# Patient Record
Sex: MALE | Race: WHITE | NOT HISPANIC OR LATINO | Employment: FULL TIME | URBAN - METROPOLITAN AREA
[De-identification: names, ages, dates, MRNs, and addresses within clinical notes are randomized per-mention and may not be internally consistent; named-entity substitution may affect disease eponyms.]

---

## 2020-11-02 ENCOUNTER — OFFICE VISIT (OUTPATIENT)
Dept: FAMILY MEDICINE CLINIC | Facility: CLINIC | Age: 35
End: 2020-11-02
Payer: COMMERCIAL

## 2020-11-02 VITALS
RESPIRATION RATE: 16 BRPM | DIASTOLIC BLOOD PRESSURE: 70 MMHG | SYSTOLIC BLOOD PRESSURE: 110 MMHG | HEART RATE: 98 BPM | WEIGHT: 162.6 LBS | BODY MASS INDEX: 24.64 KG/M2 | TEMPERATURE: 97.3 F | HEIGHT: 68 IN | OXYGEN SATURATION: 97 %

## 2020-11-02 DIAGNOSIS — F90.2 ATTENTION DEFICIT HYPERACTIVITY DISORDER (ADHD), COMBINED TYPE: Primary | ICD-10-CM

## 2020-11-02 DIAGNOSIS — F41.1 GAD (GENERALIZED ANXIETY DISORDER): ICD-10-CM

## 2020-11-02 PROBLEM — F90.9 ADHD (ATTENTION DEFICIT HYPERACTIVITY DISORDER): Status: ACTIVE | Noted: 2020-11-02

## 2020-11-02 PROCEDURE — 99204 OFFICE O/P NEW MOD 45 MIN: CPT | Performed by: FAMILY MEDICINE

## 2020-11-02 PROCEDURE — 3008F BODY MASS INDEX DOCD: CPT | Performed by: FAMILY MEDICINE

## 2020-11-02 PROCEDURE — 3725F SCREEN DEPRESSION PERFORMED: CPT | Performed by: FAMILY MEDICINE

## 2020-11-02 PROCEDURE — 1036F TOBACCO NON-USER: CPT | Performed by: FAMILY MEDICINE

## 2020-11-02 RX ORDER — DEXTROAMPHETAMINE SACCHARATE, AMPHETAMINE ASPARTATE, DEXTROAMPHETAMINE SULFATE AND AMPHETAMINE SULFATE 1.25; 1.25; 1.25; 1.25 MG/1; MG/1; MG/1; MG/1
TABLET ORAL
COMMUNITY
Start: 2020-09-22 | End: 2020-11-02 | Stop reason: SDUPTHER

## 2020-11-02 RX ORDER — FLUOXETINE 20 MG/1
TABLET, FILM COATED ORAL
COMMUNITY
Start: 2020-10-13

## 2020-11-02 RX ORDER — DEXTROAMPHETAMINE SACCHARATE, AMPHETAMINE ASPARTATE, DEXTROAMPHETAMINE SULFATE AND AMPHETAMINE SULFATE 1.25; 1.25; 1.25; 1.25 MG/1; MG/1; MG/1; MG/1
5 TABLET ORAL DAILY
Qty: 30 TABLET | Refills: 0 | Status: SHIPPED | OUTPATIENT
Start: 2020-11-02 | End: 2020-11-30 | Stop reason: SDUPTHER

## 2020-11-02 RX ORDER — LORAZEPAM 1 MG/1
TABLET ORAL
COMMUNITY
Start: 2020-10-30

## 2020-11-30 DIAGNOSIS — F90.2 ATTENTION DEFICIT HYPERACTIVITY DISORDER (ADHD), COMBINED TYPE: ICD-10-CM

## 2020-11-30 RX ORDER — DEXTROAMPHETAMINE SACCHARATE, AMPHETAMINE ASPARTATE, DEXTROAMPHETAMINE SULFATE AND AMPHETAMINE SULFATE 1.25; 1.25; 1.25; 1.25 MG/1; MG/1; MG/1; MG/1
5 TABLET ORAL DAILY
Qty: 30 TABLET | Refills: 0 | Status: SHIPPED | OUTPATIENT
Start: 2020-11-30 | End: 2021-01-05 | Stop reason: SDUPTHER

## 2021-01-05 DIAGNOSIS — F90.2 ATTENTION DEFICIT HYPERACTIVITY DISORDER (ADHD), COMBINED TYPE: ICD-10-CM

## 2021-01-07 RX ORDER — DEXTROAMPHETAMINE SACCHARATE, AMPHETAMINE ASPARTATE, DEXTROAMPHETAMINE SULFATE AND AMPHETAMINE SULFATE 1.25; 1.25; 1.25; 1.25 MG/1; MG/1; MG/1; MG/1
5 TABLET ORAL DAILY
Qty: 30 TABLET | Refills: 0 | Status: SHIPPED | OUTPATIENT
Start: 2021-01-07

## 2021-03-10 DIAGNOSIS — Z23 ENCOUNTER FOR IMMUNIZATION: ICD-10-CM

## 2022-06-14 ENCOUNTER — TELEPHONE (OUTPATIENT)
Dept: UROLOGY | Facility: AMBULATORY SURGERY CENTER | Age: 37
End: 2022-06-14

## 2022-08-08 ENCOUNTER — OFFICE VISIT (OUTPATIENT)
Dept: UROLOGY | Facility: MEDICAL CENTER | Age: 37
End: 2022-08-08
Payer: COMMERCIAL

## 2022-08-08 VITALS
SYSTOLIC BLOOD PRESSURE: 120 MMHG | OXYGEN SATURATION: 97 % | HEIGHT: 68 IN | HEART RATE: 87 BPM | BODY MASS INDEX: 22.79 KG/M2 | WEIGHT: 150.4 LBS | DIASTOLIC BLOOD PRESSURE: 80 MMHG

## 2022-08-08 DIAGNOSIS — R33.9 INCOMPLETE BLADDER EMPTYING: Primary | ICD-10-CM

## 2022-08-08 DIAGNOSIS — R39.198 SLOW URINARY STREAM: ICD-10-CM

## 2022-08-08 PROBLEM — R35.0 URINE FREQUENCY: Status: ACTIVE | Noted: 2022-08-08

## 2022-08-08 LAB
POST-VOID RESIDUAL VOLUME, ML POC: 41 ML
SL AMB  POCT GLUCOSE, UA: NORMAL
SL AMB LEUKOCYTE ESTERASE,UA: NORMAL
SL AMB POCT BILIRUBIN,UA: NORMAL
SL AMB POCT BLOOD,UA: NORMAL
SL AMB POCT CLARITY,UA: CLEAR
SL AMB POCT COLOR,UA: YELLOW
SL AMB POCT KETONES,UA: NORMAL
SL AMB POCT NITRITE,UA: NORMAL
SL AMB POCT PH,UA: 7
SL AMB POCT SPECIFIC GRAVITY,UA: 1.01
SL AMB POCT URINE PROTEIN: NORMAL
SL AMB POCT UROBILINOGEN: 0.2

## 2022-08-08 PROCEDURE — 99204 OFFICE O/P NEW MOD 45 MIN: CPT | Performed by: UROLOGY

## 2022-08-08 PROCEDURE — 51798 US URINE CAPACITY MEASURE: CPT | Performed by: UROLOGY

## 2022-08-08 PROCEDURE — 81003 URINALYSIS AUTO W/O SCOPE: CPT | Performed by: UROLOGY

## 2022-08-08 RX ORDER — FLUTICASONE PROPIONATE 50 MCG
1 SPRAY, SUSPENSION (ML) NASAL 2 TIMES DAILY
COMMUNITY

## 2022-08-08 RX ORDER — FLUOXETINE HYDROCHLORIDE 40 MG/1
40 CAPSULE ORAL DAILY
COMMUNITY
Start: 2022-06-15

## 2022-08-08 RX ORDER — MAGNESIUM 200 MG
TABLET ORAL
COMMUNITY

## 2022-08-08 NOTE — PROGRESS NOTES
HISTORY:    Two years or so of difficulty starting urination with slowing of stream   Sometimes has to bear down as though he were having a difficult bowel movement  Sometimes, the problem is not so bad  He does notice it is less trouble getting started when he sits to void  Rare nocturia, stream is good when his bladder is full  No hematuria infections stones    Of note, he has been on ketamine injections for severe anxiety, he stop those six weeks ago because of possible bladder side effects  And he thinks perhaps the problem is not so bad since he stopped that med  ASSESSMENT / PLAN:    Normal examination, minimal PVR only 41 mL  Unlikely there is any anatomic obstruction  Possibly, the ketamine was a related side effect although that medicine is more likely to cause bladder inflammation and not just difficulty starting  He will monitor symptoms and let me know if any further issues    The following portions of the patient's history were reviewed and updated as appropriate: allergies, current medications, past family history, past medical history, past social history, past surgical history and problem list     Review of Systems   All other systems reviewed and are negative  Objective:     Physical Exam  Constitutional:       General: He is not in acute distress  Appearance: He is well-developed  He is not diaphoretic  HENT:      Head: Normocephalic and atraumatic  Eyes:      General: No scleral icterus  Pulmonary:      Effort: Pulmonary effort is normal    Genitourinary:     Comments: Penis testes normal    Prostate small, normal for age  Skin:     Coloration: Skin is not pale  Neurological:      Mental Status: He is alert and oriented to person, place, and time  Psychiatric:         Behavior: Behavior normal          Thought Content:  Thought content normal          Judgment: Judgment normal            No results found for: PSA]  No results found for: BUN  No results found for: CREATININE  No components found for: CBC      Patient Active Problem List   Diagnosis    ADHD (attention deficit hyperactivity disorder)    ANAI (generalized anxiety disorder)    Urine frequency    Incomplete bladder emptying    Slow urinary stream        Diagnoses and all orders for this visit:    Incomplete bladder emptying  -     POCT urine dip auto non-scope    Slow urinary stream    Other orders  -     Magnesium 200 MG TABS; Take by mouth  -     Cholecalciferol 125 MCG (5000 UT) capsule; Take 5,000 Units by mouth daily  -     FLUoxetine (PROzac) 40 MG capsule; Take 40 mg by mouth daily  -     fluticasone (FLONASE) 50 mcg/act nasal spray; 1 spray into each nostril 2 (two) times a day           Patient ID: Trisha Barnes is a 40 y o  male  Current Outpatient Medications:     Cholecalciferol 125 MCG (5000 UT) capsule, Take 5,000 Units by mouth daily, Disp: , Rfl:     FLUoxetine (PROzac) 40 MG capsule, Take 40 mg by mouth daily, Disp: , Rfl:     fluticasone (FLONASE) 50 mcg/act nasal spray, 1 spray into each nostril 2 (two) times a day, Disp: , Rfl:     LORazepam (ATIVAN) 1 mg tablet, , Disp: , Rfl:     Magnesium 200 MG TABS, Take by mouth, Disp: , Rfl:     amphetamine-dextroamphetamine (ADDERALL) 5 MG tablet, Take 1 tablet (5 mg total) by mouth dailyMax Daily Amount: 5 mg (Patient not taking: Reported on 8/8/2022), Disp: 30 tablet, Rfl: 0    FLUoxetine (PROzac) 20 MG tablet, , Disp: , Rfl:     Past Medical History:   Diagnosis Date    ADHD     Anxiety     Gastroesophageal reflux disease without esophagitis 2010       History reviewed  No pertinent surgical history      Social History

## 2024-04-08 ENCOUNTER — TELEPHONE (OUTPATIENT)
Age: 39
End: 2024-04-08

## 2024-04-08 NOTE — TELEPHONE ENCOUNTER
Patient called stating he wanted to schedule a vasectomy .  He wanted Dr. Meeks.  No appointments available at this time.  Patient will call back to set up appointment.

## 2024-04-11 ENCOUNTER — TELEPHONE (OUTPATIENT)
Dept: UROLOGY | Facility: AMBULATORY SURGERY CENTER | Age: 39
End: 2024-04-11

## 2024-04-11 NOTE — TELEPHONE ENCOUNTER
Received message about patient needing VAS consult and VAS done. Called and scheduled both consult and VAS.

## 2024-05-07 ENCOUNTER — OFFICE VISIT (OUTPATIENT)
Dept: UROLOGY | Facility: AMBULATORY SURGERY CENTER | Age: 39
End: 2024-05-07
Payer: COMMERCIAL

## 2024-05-07 VITALS
DIASTOLIC BLOOD PRESSURE: 68 MMHG | SYSTOLIC BLOOD PRESSURE: 124 MMHG | HEART RATE: 81 BPM | OXYGEN SATURATION: 98 % | WEIGHT: 164 LBS | HEIGHT: 68 IN | BODY MASS INDEX: 24.86 KG/M2

## 2024-05-07 DIAGNOSIS — Z30.2 ENCOUNTER FOR STERILIZATION: Primary | ICD-10-CM

## 2024-05-07 PROCEDURE — 99203 OFFICE O/P NEW LOW 30 MIN: CPT

## 2024-05-07 RX ORDER — DULOXETIN HYDROCHLORIDE 30 MG/1
CAPSULE, DELAYED RELEASE ORAL
COMMUNITY
Start: 2024-04-01

## 2024-05-07 RX ORDER — DEXTROAMPHETAMINE SACCHARATE, AMPHETAMINE ASPARTATE, DEXTROAMPHETAMINE SULFATE AND AMPHETAMINE SULFATE 2.5; 2.5; 2.5; 2.5 MG/1; MG/1; MG/1; MG/1
TABLET ORAL
COMMUNITY
Start: 2024-03-04

## 2024-05-07 RX ORDER — DARIDOREXANT 50 MG/1
1 TABLET, FILM COATED ORAL
COMMUNITY
Start: 2024-04-16

## 2024-05-07 RX ORDER — TRIAMCINOLONE ACETONIDE 1 MG/G
CREAM TOPICAL
COMMUNITY
Start: 2024-02-12

## 2024-05-07 RX ORDER — LORAZEPAM 2 MG/1
2 TABLET ORAL
Qty: 1 TABLET | Refills: 0 | Status: SHIPPED | OUTPATIENT
Start: 2024-05-07

## 2024-05-07 NOTE — PROGRESS NOTES
5/7/2024      No chief complaint on file.        Assessment and Plan    39 y.o. male managed by new patient    1. Desire for elective sterilization  - exam today as below  - informed consent signed today  - continue contraception  - rx Ativan with  to/from on appt date  - shave scrotal/pubic hair day prior to appt date    Return for vasectomy as scheduled.      History of Present Illness  Pawel Dumont is a 39 y.o. male here for evaluation of VASECTOMY CONSULT    History of genitourinary or groin trauma or surgery-No  Fathered children-3   Personal and/or mutual desire for permanent sterility-yes  Current contraceptive method-condoms  Work/manual labor/lifting-no   Voiding issues- none.  Patient's previous urinary concerns while on ketamine has dissipated since discontinuation of ketamine  Bleeding issues/thinners- none  Allergies to lidocaine/marcaine/betadine/chromic- none    The patient presents requesting elective sterilization vasectomy.     We discussed that vasectomy is in operation performed in the office in order to provide elective sterilization. This procedure should be considered a permanent option. Although there are subspecialists who perform vasectomy reversals, these operations are not 100% successful and are often not covered by insurance meaning they can come with a large out-of-pocket cost. The patient understands this.     We reviewed the procedure in depth. Risks and benefits of the procedure were discussed and reviewed. Risks described included hematoma formation, Infection, sperm granuloma, epididymitis, post-vasectomy pain syndrome, and vasectomy failure  Informed consent was obtained in the office today. The patient was prescribed a benzodiazepine to take one hour prior to the procedure to assist with his comfort.  He understands that he will require transportation to and from the office that day if he is to use the benzodiazepine.       He also understands he will require semen  analysis testing at 3 months post procedure to ensure full sterilization.  In the interim, he will require contraception during intercourse to avoid an undesired pregnancy.     Usually, patients are out of work for 2-3 days. We recommend tight fitting scrotal support following the procedure along with ice packs applied to the scrotum 15 minutes on and 15 minutes off for the first 24 hours. We discussed that we do send the patient home with short course of anti-inflammatory and/or narcotic pain medication.     After this discussion, the patient agrees to proceed. We will schedule him in the near future.    He agrees to take oral sedative -Ativan 2 mg one hour prior to procedure.        Review of Systems   Constitutional: Negative.  Negative for chills, fatigue and fever.   HENT: Negative.     Respiratory:  Negative for shortness of breath.    Cardiovascular:  Negative for chest pain.   Gastrointestinal: Negative.  Negative for abdominal pain.   Endocrine: Negative.    Musculoskeletal: Negative.    Skin: Negative.    Neurological: Negative.  Negative for dizziness and light-headedness.   Hematological: Negative.    Psychiatric/Behavioral: Negative.             AUA SYMPTOM SCORE      Flowsheet Row Most Recent Value   AUA SYMPTOM SCORE    How often have you had a sensation of not emptying your bladder completely after you finished urinating? 1 (P)    How often have you had to urinate again less than two hours after you finished urinating? 1 (P)    How often have you found you stopped and started again several times when you urinate? 1 (P)    How often have you found it difficult to postpone urination? 0 (P)    How often have you had a weak urinary stream? 1 (P)    How often have you had to push or strain to begin urination? 0 (P)    How many times did you most typically get up to urinate from the time you went to bed at night until the time you got up in the morning? 1 (P)    Quality of Life: If you were to spend the  rest of your life with your urinary condition just the way it is now, how would you feel about that? 1 (P)    AUA SYMPTOM SCORE 5 (P)              Vitals  There were no vitals filed for this visit.    Physical Exam    General: Well appearing, no distress, appears stated age.  HEENT:  Normocephalic, atraumatic. Conjunctiva clear.  Respiratory: Nonlabored respirations, no wheeze or cough  Abdomen:  Soft nontender without hernia. No suprapubic or CVA tenderness.  Genitourinary: Circumcised penis, normal phallus, orthotopic patent meatus.  Testes smooth descended bilaterally into the scrotum nontender with no palpable mass.  Palpably normal spermatic cord and vas deferens bilaterally.  Musculoskeletal:  Normal range of motion and gait without defecit.  Neuro: No gross neurologic defect or abnormality. Steady unassisted gait. Speech and affect normal.  Dermatologic: skin warm, dry; no rash erythema or ecchymosis      Past History  Past Medical History:   Diagnosis Date    ADHD     Anxiety     Gastroesophageal reflux disease without esophagitis 2010     Social History     Socioeconomic History    Marital status: Single     Spouse name: Not on file    Number of children: Not on file    Years of education: Not on file    Highest education level: Not on file   Occupational History    Not on file   Tobacco Use    Smoking status: Never    Smokeless tobacco: Never   Vaping Use    Vaping status: Former    Substances: THC   Substance and Sexual Activity    Alcohol use: Never    Drug use: Yes     Types: Marijuana    Sexual activity: Not Currently   Other Topics Concern    Not on file   Social History Narrative    Not on file     Social Determinants of Health     Financial Resource Strain: Low Risk  (8/4/2022)    [facility]    Overall Financial Resource Strain (CARDIA)     Difficulty of Paying Living Expenses: Not hard at all   Food Insecurity: No Food Insecurity (8/4/2022)    Received from [employer]  [facility]    Hunger Vital Sign     Worried About Running Out of Food in the Last Year: Never true     Ran Out of Food in the Last Year: Never true   Transportation Needs: No Transportation Needs (8/4/2022)    Received from [facility]    PRAPARE - Transportation     Lack of Transportation (Medical): No     Lack of Transportation (Non-Medical): No   Physical Activity: Not on file   Stress: Stress Concern Present (8/4/2022)    Received from [facility]    [place] [employer] - Occupational Stress Questionnaire     Feeling of Stress : To some extent   Social Connections: Moderately Isolated (8/4/2022)    Received from [facility]    Social Connection and Isolation Panel [NHANES]     Frequency of Communication with Friends and Family: More than three times a week     Frequency of Social Gatherings with Friends and Family: Once a week     Attends [employer] Services: Never     Active Member of Clubs or Organizations: No     Attends Club or Organization Meetings: Never     Marital Status:    Intimate Partner Violence: Not At Risk (8/4/2022)    Received from [facility]    Humiliation, Afraid, Rape, and Kick questionnaire     Fear of Current or Ex-Partner: No     Emotionally Abused: No     Physically Abused: No     Sexually Abused: No   Housing Stability: Low Risk  (8/4/2022)    Received from [facility]    Housing Stability Vital Sign     Unable to Pay for Housing in the Last Year: No     Number of Places Lived in the Last Year: 1     Unstable Housing in the Last Year: No     Social History     Tobacco Use   Smoking Status Never   Smokeless Tobacco Never     Family History   Problem Relation Age of Onset    Prostate cancer Father     Kidney nephrosis Mother     Colon cancer Paternal Grandfather        The following portions of the patient's history were reviewed and updated as appropriate: allergies,  "current medications, past medical history, past social history, past surgical history and problem list.    Results  No results found for this or any previous visit (from the past 1 hour(s)).]  No results found for: \"PSA\"  Lab Results   Component Value Date    CALCIUM 9.6 03/07/2024    K 4.8 03/07/2024    CO2 29 03/07/2024     03/07/2024    BUN 16 03/07/2024    CREATININE 0.70 03/07/2024     No results found for: \"WBC\", \"HGB\", \"HCT\", \"MCV\", \"PLT\"      "

## 2024-05-29 ENCOUNTER — PROCEDURE VISIT (OUTPATIENT)
Dept: UROLOGY | Facility: AMBULATORY SURGERY CENTER | Age: 39
End: 2024-05-29
Payer: COMMERCIAL

## 2024-05-29 VITALS
OXYGEN SATURATION: 99 % | DIASTOLIC BLOOD PRESSURE: 68 MMHG | BODY MASS INDEX: 24.48 KG/M2 | HEART RATE: 95 BPM | SYSTOLIC BLOOD PRESSURE: 96 MMHG | WEIGHT: 161 LBS

## 2024-05-29 DIAGNOSIS — Z30.2 ENCOUNTER FOR STERILIZATION: Primary | ICD-10-CM

## 2024-05-29 PROCEDURE — 55250 REMOVAL OF SPERM DUCT(S): CPT | Performed by: UROLOGY

## 2024-05-29 PROCEDURE — 88302 TISSUE EXAM BY PATHOLOGIST: CPT | Performed by: PATHOLOGY

## 2024-05-29 NOTE — PROGRESS NOTES
Vasectomy     Date/Time  5/29/2024 10:30 AM     Performed by  Justin Gan MD   Authorized by  Justin Gan MD           Pawel is a 39-year-old male who request elective sterilization with vasectomy.  He is  with 3 children.  He and his wife desire no further children.      Vasectomy Procedure Note:  Risks and benefits of vasectomy were previously discussed. Informed consent was obtained at his prior office visit. The patient had taken Ativan as prescribed.  The patient was placed in the supine position. His genitalia was prepped and draped in a sterile fashion. The left vas deferens was grasped and presented to the midline of the scrotum. 2% lidocaine was used to anesthetize the skin, subcutaneous tissue, and left vas deferens. A scalpeless opening was made in the midline of the scrotum. The left vas deferens was grasped and presented into the surgical field. A #15 blade was used to make a longitudinal incision in the sheath of the vas deferens. The vas itself was then dissected free from the surrounding tissue. Clamps were placed proximally and distally and a 1 cm segment of the vas deferens was excised. Silk ties were applied and exposed ends were fulgurated. Hemostasis was excellent. The vas was returned to its natural anatomic position and the same procedure was then repeated on the patient's right side. A single stitch was placed through the skin and dartos to reapproximate the defect. Bacitracin ointment was applied.      The patient is status post vasectomy. I recommended rest, ice, and scrotal support. I've asked that he return in the next 2-3 weeks for a post vasectomy check. Tylenol/Advil as needed for pain. The patient understands that he is not yet considered sterile. I recommend a single post vasectomy semen analysis at 8 weeks. In the interim I have recommended contraception to avoid an undesired pregnancy.

## 2024-05-31 PROCEDURE — 88302 TISSUE EXAM BY PATHOLOGIST: CPT | Performed by: PATHOLOGY

## 2024-07-22 ENCOUNTER — APPOINTMENT (OUTPATIENT)
Dept: LAB | Facility: CLINIC | Age: 39
End: 2024-07-22
Payer: COMMERCIAL

## 2024-07-22 DIAGNOSIS — Z30.2 ENCOUNTER FOR STERILIZATION: ICD-10-CM

## 2024-07-22 LAB
DEPRECATED CD4 CELLS/CD8 CELLS BLD: 3.6 ML
SPERM MOTILE SMN QL MICRO: NORMAL

## 2024-07-22 PROCEDURE — 89321 SEMEN ANAL SPERM DETECTION: CPT

## 2024-07-23 ENCOUNTER — TELEPHONE (OUTPATIENT)
Dept: UROLOGY | Facility: AMBULATORY SURGERY CENTER | Age: 39
End: 2024-07-23

## 2024-07-23 NOTE — TELEPHONE ENCOUNTER
----- Message from Justin Gan MD sent at 7/23/2024  7:36 AM EDT -----  Post vas SA is neg.  Ok to tell patient he does not need contraception.    FT  ----- Message -----  From: Lab, Background User  Sent: 7/22/2024   1:20 PM EDT  To: Justin Gan MD      Spoke with pt and advised of results. Pt in verbal understanding with no questions.

## 2025-07-03 ENCOUNTER — OFFICE VISIT (OUTPATIENT)
Dept: GASTROENTEROLOGY | Facility: MEDICAL CENTER | Age: 40
End: 2025-07-03
Payer: COMMERCIAL

## 2025-07-03 VITALS
HEIGHT: 68 IN | HEART RATE: 70 BPM | TEMPERATURE: 96.9 F | WEIGHT: 141 LBS | SYSTOLIC BLOOD PRESSURE: 114 MMHG | OXYGEN SATURATION: 99 % | DIASTOLIC BLOOD PRESSURE: 76 MMHG | BODY MASS INDEX: 21.37 KG/M2

## 2025-07-03 DIAGNOSIS — K64.5 EXTERNAL THROMBOSED HEMORRHOIDS: Primary | ICD-10-CM

## 2025-07-03 PROCEDURE — 99204 OFFICE O/P NEW MOD 45 MIN: CPT | Performed by: PHYSICIAN ASSISTANT

## 2025-07-03 NOTE — PROGRESS NOTES
"Name: Pawel Dumont      : 1985      MRN: [id number]  Encounter Provider: Michelle De Jesus PA-C  Encounter Date: 7/3/2025   Encounter department: [facility]  :  Assessment & Plan  External thrombosed hemorrhoids  Physical examination reveals a thrombosed external hemorrhoid, normal internal exam.  I recommend stool softeners, sitz bath's, analgesics, and local anesthetic ointment.  Symptoms should self resolve within 1 to 3 weeks.  If no improvement can consider colorectal evaluation.  For long-term management recommend high-fiber diet with 38 g daily.           History of Present Illness   Pawel Dumont is a 40 y.o. male who presents for hemorrhoids.  The patient states that he has pain and irritation from a hemorrhoid.  He denies any bleeding.  Feels a bump when he is wiping.  Typically has a formed bowel movement every other day, no straining.  He has a history of hemorrhoids which required banding.  No other GI symptoms or complaints.        Review of Systems A complete review of systems is negative other than that noted above in the HPI.       Objective   /76 (BP Location: Right arm, Patient Position: Sitting, Cuff Size: Standard)   Pulse 70   Temp (!) 96.9 °F (36.1 °C) (Tympanic)   Ht 5' 8\" (1.727 m)   Wt 64 kg (141 lb)   SpO2 99%   BMI 21.44 kg/m²       Physical Exam  Vitals and nursing note reviewed. Exam conducted with a chaperone present.   Constitutional:       General: He is not in acute distress.     Appearance: He is well-developed.   HENT:      Head: Normocephalic and atraumatic.     Eyes:      Conjunctiva/sclera: Conjunctivae normal.       Cardiovascular:      Rate and Rhythm: Normal rate and regular rhythm.      Heart sounds: No murmur heard.  Pulmonary:      Effort: Pulmonary effort is normal. No respiratory distress.      Breath sounds: Normal breath sounds.   Abdominal:      Palpations: Abdomen is soft.      Tenderness: There is no " abdominal tenderness.   Genitourinary:     Rectum: External hemorrhoid (thrombosed) present. No internal hemorrhoid. Normal anal tone.     Musculoskeletal:         General: No swelling.      Cervical back: Neck supple.     Skin:     General: Skin is warm and dry.      Capillary Refill: Capillary refill takes less than 2 seconds.     Neurological:      Mental Status: He is alert.     Psychiatric:         Mood and Affect: Mood normal.          Lab Results: I personally reviewed relevant lab results.

## 2025-07-03 NOTE — PATIENT INSTRUCTIONS
"Patient Education     High-fiber diet   The Basics   Written by the doctors and editors at [facility]   What is fiber? -- Fiber is a substance found in some fruits, vegetables, and grains. Most fiber passes through your body without being digested. But it can affect how you digest other foods, and it can also improve your bowel movements.  There are 2 kinds of fiber. One kind is called \"soluble fiber\" and is found in fruits, oats, barley, beans, and peas. The other kind is called \"insoluble fiber,\" and is found in wheat, rye, and other grains.  Both kinds of fiber that you eat are called \"dietary fiber.\"  Why is fiber important to my health? -- Fiber can help make your bowel movements softer and more regular. Adding fiber to your diet can help with problems including constipation, hemorrhoids, and diarrhea. Plus, it can help prevent \"accidents\" if you have trouble controlling your bowel movements.  Getting enough fiber can also help lower your risk of heart disease, stroke, and type 2 diabetes. That's because fiber can help lower cholesterol and help control blood sugar.  How much fiber do I need? -- The recommended amount of fiber is 38 grams a day. The nutrition label on packaged foods can show you how much fiber you are getting in each serving (figure 1).  How can I make sure I'm getting enough fiber? -- To make sure that you're getting enough fiber, eat plenty of the fruits, vegetables, and grains that contain fiber (table 1 and figure 2). Many breakfast cereals also have a lot of fiber.  If you can't get enough fiber from food, you can add wheat bran to the foods you do eat. Or you can take fiber supplements. These come in the form of powders, wafers, or pills. They include psyllium seed (sample brand names: Metamucil, Konsyl), methylcellulose (sample brand name: Citrucel), polycarbophil (sample brand name: FiberCon), and wheat dextrin (sample brand name: Benefiber). If you take a fiber supplement, be sure to " "read the label so you know how much to take. If you're not sure, ask your doctor or nurse.  What are the side effects of fiber? -- When you start eating more fiber, your belly might feel bloated, or you might have gas or cramps. You can avoid these side effects by adding fiber to your diet slowly.  Some people feel worse when they eat more fiber or take fiber supplements. If you feel worse after adding more fiber to your diet, you can try decreasing the amount of fiber to see if that helps.  All topics are updated as new evidence becomes available and our peer review process is complete.  This topic retrieved from [employer] on: Feb 28, 2024.  Topic 11198 Version 10.0  Release: 32.2.4 - C32.58  © 2024 UpToDate, Inc. and/or its affiliates. All rights reserved.  figure 1: Nutrition label - Fiber     This is an example of a nutrition label. To figure out how much fiber is in a food, look for the line that says \"Dietary Fiber.\" It's also important to look at the serving size. This food has 7 grams of fiber in each serving, and each serving is 1 cup.  Graphic 87692 Version 8.0  table 1: Amount of fiber in different foods  Food  Serving  Grams of fiber    Fruits    Apple (with skin) 1 medium apple 4.4   Banana 1 medium banana 3.1   Oranges 1 orange 3.1   Prunes 1 cup, pitted 12.4   Juices    Apple, unsweetened, with added ascorbic acid 1 cup 0.5   Grapefruit, white, canned, sweetened 1 cup 0.2   Grape, unsweetened, with added ascorbic acid 1 cup 0.5   Orange 1 cup 0.7   Vegetables    Cooked   Green beans 1 cup 4.0   Carrots 1/2 cup sliced 2.3   Peas 1 cup 8.8   Potato (baked, with skin) 1 medium potato 3.8   Raw   Cucumber (with peel) 1 cucumber 1.5   Lettuce 1 cup shredded 0.5   Tomato 1 medium tomato 1.5   Spinach 1 cup 0.7   Legumes   Baked beans, canned, no salt added 1 cup 13.9   Kidney beans, canned 1 cup 13.6   Lima beans, canned 1 cup 11.6   Lentils, boiled 1 cup 15.6   Breads, pastas, flours    Bran muffins 1 " medium muffin 5.2   Oatmeal, cooked 1 cup 4.0   White bread 1 slice 0.6   Whole-wheat bread 1 slice 1.9   Pasta and rice, cooked   Macaroni 1 cup 2.5   Rice, brown 1 cup 3.5   Rice, white 1 cup 0.6   Spaghetti (regular) 1 cup 2.5   Nuts    Almonds 1/2 cup 8.7   Peanuts 1/2 cup 7.9   To learn how much fiber and other nutrients are in different foods, visit the [place] [employer] ([employer]) Food[employer] website.  Graphic 24386 Version 6.0  figure 2: Foods with fiber     Foods with a lot of fiber include prunes, apples, oranges, bananas, peas, green beans, kidney beans, cooked oatmeal, almonds, peanuts, and whole-wheat bread.  Graphic 91696 Version 1.0  Consumer Information Use and Disclaimer   Disclaimer: This generalized information is a limited summary of diagnosis, treatment, and/or medication information. It is not meant to be comprehensive and should be used as a tool to help the user understand and/or assess potential diagnostic and treatment options. It does NOT include all information about conditions, treatments, medications, side effects, or risks that may apply to a specific patient. It is not intended to be medical advice or a substitute for the medical advice, diagnosis, or treatment of a health care provider based on the health care provider's examination and assessment of a patient's specific and unique circumstances. Patients must speak with a health care provider for complete information about their health, medical questions, and treatment options, including any risks or benefits regarding use of medications. This information does not endorse any treatments or medications as safe, effective, or approved for treating a specific patient. UpToDate, Inc. and its affiliates disclaim any warranty or liability relating to this information or the use thereof.The use of this information is governed by the Terms of Use, available at  https://www.[employer]uwer.com/en/know/clinical-effectiveness-terms. 2024© UpToDate, Inc. and its affiliates and/or licensors. All rights reserved.  Copyright   © 2024 UpToDate, Inc. and/or its affiliates. All rights reserved.      Physical examination revealed that you have a thrombosed external hemorrhoid.  This is managed conservatively with stool softeners (colace), sitz baths, analgesics (ibuprofen), and local anesthetic ointments (tucks) or witch hazel (tucks) which typically lead to symptom resolution within 1 to 3 weeks. If not improvement please let us know and we can send you to colorectal       Patient Education     How to take a sitz bath   The Basics   Written by the doctors and editors at [facility]   What is a sitz bath? -- A sitz bath is a shallow, warm bath that you sit in. It can help heal injuries in your genital area, perineum, or anal area. (The perineum is the area between the genitals and the anus.) A sitz bath can also help with symptoms like pain, swelling, itching, or burning.  Your doctor or nurse might suggest a sitz bath because you:   Had a vaginal childbirth - This is when a baby is born through the vagina. Sometimes, vaginal birth can cause the perineum to tear.   Have an anal fissure - This is a small tear in lining of the anus.   Have hemorrhoids - These are swollen veins in the lower rectum that can cause pain or itching.   Have another condition that is causing symptoms in the genital or anal area  How do I take a sitz bath?    Fill a clean bathtub with 2 to 3 inches (5 to 8 cm) of warm water - Make sure that the water is not too hot. If you do not have a bathtub, you can buy a special basin that fits over your toilet seat.   Use plain water - Do not add soap, bubble bath, or anything else to the water. If you want, you can add 1 cup of Epsom salt.   Sit in the water for 5 to 10 minutes at least 2 to 3 times a day. You can take sitz baths more often if needed.   When you are done,  drain the water and rinse the tub (or basin).   Gently pat the area dry with a clean towel - Do not rub the area too hard. You can also use a hair dryer on a cool, low setting to dry the area.  When should I call the doctor? -- Call your doctor for advice if:   Your pain is getting worse.   You have a fever of 100.4°F (38°C) or higher.   You have heavy bleeding.  All topics are updated as new evidence becomes available and our peer review process is complete.  This topic retrieved from [employer] on: Apr 11, 2024.  Topic 904160 Version 1.0  Release: 32.3.2 - C32.100  © 2024 UpToDate, Inc. and/or its affiliates. All rights reserved.  Consumer Information Use and Disclaimer   Disclaimer: This generalized information is a limited summary of diagnosis, treatment, and/or medication information. It is not meant to be comprehensive and should be used as a tool to help the user understand and/or assess potential diagnostic and treatment options. It does NOT include all information about conditions, treatments, medications, side effects, or risks that may apply to a specific patient. It is not intended to be medical advice or a substitute for the medical advice, diagnosis, or treatment of a health care provider based on the health care provider's examination and assessment of a patient's specific and unique circumstances. Patients must speak with a health care provider for complete information about their health, medical questions, and treatment options, including any risks or benefits regarding use of medications. This information does not endorse any treatments or medications as safe, effective, or approved for treating a specific patient. UpToDate, Inc. and its affiliates disclaim any warranty or liability relating to this information or the use thereof.The use of this information is governed by the Terms of Use, available at https://www.[employer].com/en/know/clinical-effectiveness-terms. 2024© UpToDate, Inc. and its  affiliates and/or licensors. All rights reserved.  Copyright   © 2024 [employer] and/or its affiliates. All rights reserved.